# Patient Record
Sex: FEMALE | Race: WHITE | NOT HISPANIC OR LATINO | URBAN - METROPOLITAN AREA
[De-identification: names, ages, dates, MRNs, and addresses within clinical notes are randomized per-mention and may not be internally consistent; named-entity substitution may affect disease eponyms.]

---
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
## 2024-11-24 ENCOUNTER — EMERGENCY (EMERGENCY)
Facility: HOSPITAL | Age: 19
LOS: 0 days | Discharge: HOME | End: 2024-11-24
Attending: EMERGENCY MEDICINE
Payer: COMMERCIAL

## 2024-11-24 VITALS — WEIGHT: 149.91 LBS | HEIGHT: 66 IN

## 2024-11-24 VITALS
SYSTOLIC BLOOD PRESSURE: 135 MMHG | OXYGEN SATURATION: 100 % | DIASTOLIC BLOOD PRESSURE: 93 MMHG | HEART RATE: 107 BPM | RESPIRATION RATE: 20 BRPM | TEMPERATURE: 98 F
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
## 2024-11-24 DIAGNOSIS — S93.402A SPRAIN OF UNSPECIFIED LIGAMENT OF LEFT ANKLE, INITIAL ENCOUNTER: ICD-10-CM

## 2024-11-24 DIAGNOSIS — M25.572 PAIN IN LEFT ANKLE AND JOINTS OF LEFT FOOT: ICD-10-CM
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
## 2024-11-24 PROCEDURE — 99284 EMERGENCY DEPT VISIT MOD MDM: CPT | Mod: 25

## 2024-11-24 PROCEDURE — 73590 X-RAY EXAM OF LOWER LEG: CPT | Mod: LT

## 2024-11-24 PROCEDURE — 73630 X-RAY EXAM OF FOOT: CPT | Mod: LT

## 2024-11-24 PROCEDURE — 99284 EMERGENCY DEPT VISIT MOD MDM: CPT

## 2024-11-24 PROCEDURE — 73610 X-RAY EXAM OF ANKLE: CPT | Mod: LT

## 2024-11-24 PROCEDURE — 73590 X-RAY EXAM OF LOWER LEG: CPT | Mod: 26,LT

## 2024-11-24 PROCEDURE — 73610 X-RAY EXAM OF ANKLE: CPT | Mod: 26,LT

## 2024-11-24 PROCEDURE — 73630 X-RAY EXAM OF FOOT: CPT | Mod: 26,LT

## 2024-11-24 RX ORDER — IBUPROFEN 200 MG
600 TABLET ORAL ONCE
Refills: 0 | Status: COMPLETED | OUTPATIENT
Start: 2024-11-24 | End: 2024-11-24

## 2024-11-24 RX ADMIN — Medication 600 MILLIGRAM(S): at 19:35

## 2024-11-24 NOTE — ED STATDOCS - PATIENT PORTAL LINK FT
You can access the FollowMyHealth Patient Portal offered by [facility] by registering at the following website: [email or web address]. By joining [employer]’s FollowMyHealth portal, you will also be able to view your health information using other applications (apps) compatible with our system.

## 2024-11-24 NOTE — ED STATDOCS - OBJECTIVE STATEMENT
19-year-old female with a past medical history, who presents with chief complaint of a left ankle injury.  Patient states that she rolled her left ankle walking down 3 steps.  She complains of pain and swelling to the left ankle.  Denies any other injuries.  No medication taken prior to arrival.  She states that she has not attempted to walk since her injury.  Denies prior injury.  No other concerns.

## 2024-11-24 NOTE — ED STATDOCS - NSFOLLOWUPCLINICS_GEN_ALL_ED_FT
[facility]  Orthopedic Surgery  [address] [place] [address]  Phone: [phone]  Fax:     [facility]  Orthopedic Surgery  [address]  [place] [address]  Phone: [phone]  Fax:     [facility]  Orthopedic Surgery  [address], 3rd & [address]th [place] [address]  Phone: [phone]  Fax:     [facility]  Orthopedic Surgery  [address]  [place] [address]  Phone: [phone]  Fax:     [facility]  Orthopedic Surgery  [address] [place] [address]  Phone: [phone]  Fax:     [facility]  Orthopedic Surgery  [address] [place] [address]  Phone: [phone]  Fax:

## 2024-11-24 NOTE — ED ADULT TRIAGE NOTE - CHIEF COMPLAINT QUOTE
States she rolled her ankle while walking down stairs, and slipped down about 2 cement steps. Denies hitting head, no LOC. No AC use. Complaining of pain to left ankle, swelling noted. No meds PTA. No other obvious injuries noted.

## 2024-11-24 NOTE — ED ADULT NURSE NOTE - NSFALLUNIVINTERV_ED_ALL_ED
Bed/Stretcher in lowest position, wheels locked, appropriate side rails in place/Call bell, personal items and telephone in reach/Instruct patient to call for assistance before getting out of bed/chair/stretcher/Non-slip footwear applied when patient is off stretcher/[place] to call system/Physically safe environment - no spills, clutter or unnecessary equipment/Purposeful proactive rounding/Room/bathroom lighting operational, light cord in reach Epidermal Closure Graft Donor Site (Optional): simple interrupted

## 2024-11-24 NOTE — ED STATDOCS - PHYSICAL EXAMINATION
Patient is awake, alert, orient x 3  Heart sounds within normal limits  Lung sounds are clear bilaterally  Left ankle with anterior swelling tenderness palpation the lateral malleolus, no obvious deformity, neurovascular intact, compartments are soft, no overlying wounds

## 2024-11-24 NOTE — ED STATDOCS - CLINICAL SUMMARY MEDICAL DECISION MAKING FREE TEXT BOX
X-rays were performed and independently interpreted by myself reveal no acute fracture or dislocation.  Patient likely with ankle sprain.  Aircast and crutches provided.  Patient was given Motrin for pain.  Discharged home in good condition with supportive care, RICE therapy.  Recommend close outpatient follow-up with orthopedics.  Strict return precautions given for any worsening.  Patient verbalized understanding and agrees to plan this time.

## 2024-11-24 NOTE — ED STATDOCS - WR INTERPRETATION 1
Detail Level: Simple Detail Level: Zone MSK XR negative - No fracture, No dislocation, No foreign body

## 2024-11-24 NOTE — ED STATDOCS - NSFOLLOWUPCLINICSTOKEN_GEN_ALL_ED_FT
[id number]: || ||00\01||False;[id number]: || ||00\01||False;[id number]: || ||00\01||False;[id number]: || ||00\01||False;[id number]: || ||00\01||False;[id number]: || ||00\01||False;

## 2024-11-24 NOTE — ED STATDOCS - PROGRESS NOTE DETAILS
Geoff PGY3: Pt was reassessed and is doing well. Results, including any incidental findings, were discussed. Follow up and return precautions were discussed. Patient verbalized understanding. Placed in ace bandage for comfort as she could not tolerate aircast application.

## 2024-11-24 NOTE — ED ADULT NURSE NOTE - OBJECTIVE STATEMENT
pt presents to the ER for L ankle pain. reports tripping on the stairs and rolling her ankle. pt states "I haven't even tried to stand on it yet". swelling noted. denies any other medical complaints.

## 2024-11-24 NOTE — ED STATDOCS - NSFOLLOWUPINSTRUCTIONS_ED_ALL_ED_FT
You were seen in the Emergency Department for ankle pain after injuring it. You underwent xrays which showed no foot/ankle fracture or dislocation. You were wrapped in an ace bandage for comfort and given an aircast for extra support.    To control your pain at home, you should take Ibuprofen 600 mg along with Tylenol 650mg-1000mg every 6 to 8 hours. Limit your maximum daily Tylenol from all sources to 4000mg. Be aware that many other medications contain acetaminophen which is also known as Tylenol. Taking Tylenol and Ibuprofen together has been shown to be more effective at relieving pain than taking them separately. These are both over the counter medications that you can  at your local pharmacy without a prescription. You need to respect all of the warnings on the bottles. You shouldn’t take these medications for more than a week without following up with your doctor. Both medications come with certain risks and side effects that you need to discuss with your doctor, especially if you are taking them for a prolonged period.    1) Continue all previously prescribed medications as directed.    2) Follow up with your primary care physician - take copies of your results.    3) Return to the Emergency Department for worsening or persistent symptoms, and/or ANY NEW OR CONCERNING SYMPTOMS.